# Patient Record
Sex: FEMALE | Race: WHITE | URBAN - METROPOLITAN AREA
[De-identification: names, ages, dates, MRNs, and addresses within clinical notes are randomized per-mention and may not be internally consistent; named-entity substitution may affect disease eponyms.]

---

## 2017-08-18 ENCOUNTER — HISTORICAL (OUTPATIENT)
Dept: RADIOLOGY | Facility: HOSPITAL | Age: 22
End: 2017-08-18

## 2018-01-10 ENCOUNTER — HISTORICAL (OUTPATIENT)
Dept: ADMINISTRATIVE | Facility: HOSPITAL | Age: 23
End: 2018-01-10

## 2018-01-10 LAB
B-HCG FREE SERPL-ACNC: 1899 MIU/ML
PROGEST SERPL-MCNC: 19.4 NG/ML

## 2020-01-29 ENCOUNTER — HISTORICAL (OUTPATIENT)
Dept: LAB | Facility: HOSPITAL | Age: 25
End: 2020-01-29

## 2020-01-29 LAB
ABS NEUT (OLG): 5.4 X10(3)/MCL (ref 1.5–6.9)
ALBUMIN SERPL-MCNC: 4 GM/DL (ref 3.4–5)
ALBUMIN/GLOB SERPL: 1 RATIO
ALP SERPL-CCNC: 61 UNIT/L (ref 30–113)
ALT SERPL-CCNC: 19 UNIT/L (ref 10–45)
AST SERPL-CCNC: 7 UNIT/L (ref 15–37)
BASOPHILS # BLD AUTO: 0 X10(3)/MCL (ref 0–0.1)
BASOPHILS NFR BLD AUTO: 0 % (ref 0–1)
BILIRUB SERPL-MCNC: 0.3 MG/DL (ref 0.1–0.9)
BILIRUBIN DIRECT+TOT PNL SERPL-MCNC: 0.1 MG/DL (ref 0–0.3)
BILIRUBIN DIRECT+TOT PNL SERPL-MCNC: 0.2 MG/DL
BUN SERPL-MCNC: 13 MG/DL (ref 10–20)
CALCIUM SERPL-MCNC: 8.9 MG/DL (ref 8–10.5)
CHLORIDE SERPL-SCNC: 106 MMOL/L (ref 100–108)
CO2 SERPL-SCNC: 28 MMOL/L (ref 21–35)
CREAT SERPL-MCNC: 0.76 MG/DL (ref 0.7–1.3)
ERYTHROCYTE [DISTWIDTH] IN BLOOD BY AUTOMATED COUNT: 11.6 % (ref 11.5–17)
GLOBULIN SER-MCNC: 3.9 GM/DL
GLUCOSE SERPL-MCNC: 87 MG/DL (ref 75–116)
HCT VFR BLD AUTO: 40.7 % (ref 36–48)
HGB BLD-MCNC: 13.4 GM/DL (ref 12–16)
IMM GRANULOCYTES # BLD AUTO: 0.02 10*3/UL (ref 0–0.02)
IMM GRANULOCYTES NFR BLD AUTO: 0.3 % (ref 0–0.43)
LYMPHOCYTES # BLD AUTO: 0.6 X10(3)/MCL (ref 0.5–4.1)
LYMPHOCYTES NFR BLD AUTO: 9 % (ref 15–40)
MCH RBC QN AUTO: 32 PG (ref 27–34)
MCHC RBC AUTO-ENTMCNC: 33 GM/DL (ref 31–36)
MCV RBC AUTO: 98 FL (ref 80–99)
MONOCYTES # BLD AUTO: 0.6 X10(3)/MCL (ref 0–1.1)
MONOCYTES NFR BLD AUTO: 9 % (ref 4–12)
NEUTROPHILS # BLD AUTO: 5.4 X10(3)/MCL (ref 1.5–6.9)
NEUTROPHILS NFR BLD AUTO: 82 % (ref 43–75)
PLATELET # BLD AUTO: 281 X10(3)/MCL (ref 140–400)
PMV BLD AUTO: 8.7 FL (ref 6.8–10)
POTASSIUM SERPL-SCNC: 3.7 MMOL/L (ref 3.6–5.2)
PROT SERPL-MCNC: 7.9 GM/DL (ref 6.4–8.2)
RBC # BLD AUTO: 4.17 X10(6)/MCL (ref 4.2–5.4)
SODIUM SERPL-SCNC: 141 MMOL/L (ref 135–145)
WBC # SPEC AUTO: 6.6 X10(3)/MCL (ref 4.5–11.5)

## 2021-01-28 ENCOUNTER — HISTORICAL (OUTPATIENT)
Dept: RADIOLOGY | Facility: HOSPITAL | Age: 26
End: 2021-01-28

## 2021-01-28 LAB
ABS NEUT (OLG): 2.5 X10(3)/MCL (ref 1.5–6.9)
ALBUMIN SERPL-MCNC: 4.6 GM/DL (ref 3.5–5)
ALBUMIN/GLOB SERPL: 1.4 RATIO (ref 1.1–2)
ALP SERPL-CCNC: 58 UNIT/L (ref 40–150)
ALT SERPL-CCNC: 17 UNIT/L (ref 0–55)
AMYLASE SERPL-CCNC: 68 UNIT/L (ref 25–125)
AST SERPL-CCNC: 15 UNIT/L (ref 5–34)
BILIRUB SERPL-MCNC: 0.5 MG/DL
BILIRUBIN DIRECT+TOT PNL SERPL-MCNC: 0.2 MG/DL (ref 0–0.5)
BILIRUBIN DIRECT+TOT PNL SERPL-MCNC: 0.3 MG/DL (ref 0–0.8)
BUN SERPL-MCNC: 8 MG/DL (ref 7–18.7)
CALCIUM SERPL-MCNC: 9.5 MG/DL (ref 8.4–10.2)
CHLORIDE SERPL-SCNC: 104 MMOL/L (ref 98–107)
CO2 SERPL-SCNC: 26 MMOL/L (ref 22–29)
CREAT SERPL-MCNC: 0.76 MG/DL (ref 0.55–1.02)
ERYTHROCYTE [DISTWIDTH] IN BLOOD BY AUTOMATED COUNT: 11.4 % (ref 11.5–17)
GLOBULIN SER-MCNC: 3.4 GM/DL (ref 2.4–3.5)
GLUCOSE SERPL-MCNC: 81 MG/DL (ref 74–100)
HCT VFR BLD AUTO: 42 % (ref 36–48)
HGB BLD-MCNC: 14.1 GM/DL (ref 12–16)
LIPASE SERPL-CCNC: 24 U/L
MCH RBC QN AUTO: 31 PG (ref 27–34)
MCHC RBC AUTO-ENTMCNC: 34 GM/DL (ref 31–36)
MCV RBC AUTO: 93 FL (ref 80–99)
PLATELET # BLD AUTO: 347 X10(3)/MCL (ref 140–400)
PMV BLD AUTO: 8.9 FL (ref 6.8–10)
POTASSIUM SERPL-SCNC: 4 MMOL/L (ref 3.5–5.1)
PROT SERPL-MCNC: 8 GM/DL (ref 6.4–8.3)
RBC # BLD AUTO: 4.51 X10(6)/MCL (ref 4.2–5.4)
SODIUM SERPL-SCNC: 140 MMOL/L (ref 136–145)
WBC # SPEC AUTO: 5.5 X10(3)/MCL (ref 4.5–11.5)

## 2023-11-28 LAB
C TRACH RRNA SPEC QL PROBE: NEGATIVE
HBV SURFACE AG SERPL QL IA: NEGATIVE
HIV 1+2 AB+HIV1 P24 AG SERPL QL IA: NEGATIVE
N GONORRHOEAE, AMPLIFIED DNA: NEGATIVE
RPR: NONREACTIVE
RUBELLA IMMUNE STATUS: NORMAL

## 2024-05-29 LAB — PRENATAL STREP B CULTURE: NEGATIVE

## 2024-06-07 ENCOUNTER — HOSPITAL ENCOUNTER (EMERGENCY)
Facility: HOSPITAL | Age: 29
Discharge: HOME | End: 2024-06-07
Payer: COMMERCIAL

## 2024-06-07 VITALS
SYSTOLIC BLOOD PRESSURE: 135 MMHG | DIASTOLIC BLOOD PRESSURE: 84 MMHG | HEART RATE: 107 BPM | BODY MASS INDEX: 36.8 KG/M2 | TEMPERATURE: 98 F | WEIGHT: 200 LBS | OXYGEN SATURATION: 97 % | RESPIRATION RATE: 22 BRPM | HEIGHT: 62 IN

## 2024-06-07 PROCEDURE — 99284 EMERGENCY DEPT VISIT MOD MDM: CPT

## 2024-06-08 NOTE — ED PROVIDER NOTES
"SHAKIRA NOTE     Admit Date: 2024  SHAKIRA Physician: Matt Deleon  Primary OBGYN: Dr. Palafox    Admit Diagnosis/Chief Complaint: Leakage of Fluid      Chief Complaint   Patient presents with    leaking fluid       Hospital Course:  Zayra Ceballos is a 28 y.o.  at 37w6d presents complaining of Leaking of Fluid,  Happened this morning.  No continuous leaking or big gush      Patient states no complications in this pregnancy.     Patient denies vaginal bleeding, contractions, headache, vision changes, RUQ pain, dysuria, fever, and nausea/vomiting.  Fetal Movement: normal.    BP (!) 134/90 (BP Location: Left arm, Patient Position: Lying)   Pulse (!) 118   Temp 98.2 °F (36.8 °C) (Oral)   Resp (!) 22   Ht 5' 2" (1.575 m)   Wt 90.7 kg (200 lb)   SpO2 97%   BMI 36.58 kg/m²   Temp:  [98.2 °F (36.8 °C)] 98.2 °F (36.8 °C)  Pulse:  [118] 118  Resp:  [22] 22  SpO2:  [97 %] 97 %  BP: (134)/(90) 134/90    General: in no apparent distress well developed and well nourished non-toxic in no respiratory distress and acyanotic  Abdominal: soft, nontender, nondistended, no abnormal masses, no epigastric pain FHT present  Back: lumbar tenderness absent   CVA tenderness none  Extremeties no redness or tenderness in the calves or thighs no edema    SSE:   SVE:SVE (PeriWATCH)  Dilation (cm): 0  Examined by:: CHET Ortega RN  Vaginal Exam Comments: Closed, thick, high       ROM PLUS negative    FHT:  Reactive  TOCO: Contractions irritability      LABS:   No results found for this or any previous visit (from the past 24 hour(s)).  [unfilled]     Imaging Results    None          ASSESMENT: Zayra Ceballos is a 28 y.o.   at 37w6d rule out rupture of membranes  Observation in SHAKIRA  Low suspicion of rupture of membranes   Labor precautions reviewed with patient  ER precautions reviewed with patient  Patient was given an opportunity to ask questions  Patient is to follow-up with her primary care " physician        Discharge Diagnosis/Clinical Impression**: Rule out Spontaneous Rupture Of Membranes    Status:Stable    Patient Instructions:       - Pt was given routine pregnancy instructions including to return to triage if she had any vaginal bleeding (other than spotting for the next 48hrs), any loss of fluid like her water broke, decreased fetal movement, or contractions Q 5min lasting for 2 or more hours. Pt was also instructed to drink copious water. Patient voiced understanding of all these instructions and was subsequently discharged home.    She will follow up with her primary OB.        This note was created with the assistance of [employer] voice recognition software. There may be transcription errors as a result of using this technology however minimal. Effort has been made to assure accuracy of transcription but any obvious errors or omissions should be clarified with the author of the document.

## 2024-06-08 NOTE — ED NOTES
EFM and toco removed for discharge. Discharge instructions given on when to return to hospital. Answered questions regarding labor precautions and taking accurate blood pressures at home. Verb understanding. Will follow up with primary OB for regularly scheduled prenatal visit next week. Ambulating independently off unit accompanied by mother to private vehicle.

## 2024-06-18 DIAGNOSIS — Z34.90 ENCOUNTER FOR INDUCTION OF LABOR: Primary | ICD-10-CM

## 2024-06-18 RX ORDER — OXYTOCIN/RINGER'S LACTATE 30/500 ML
95 PLASTIC BAG, INJECTION (ML) INTRAVENOUS ONCE AS NEEDED
Status: CANCELLED | OUTPATIENT
Start: 2024-06-18 | End: 2035-11-15

## 2024-06-18 RX ORDER — ACETAMINOPHEN 325 MG/1
650 TABLET ORAL EVERY 6 HOURS PRN
Status: CANCELLED | OUTPATIENT
Start: 2024-06-18

## 2024-06-18 RX ORDER — SODIUM CHLORIDE, SODIUM LACTATE, POTASSIUM CHLORIDE, CALCIUM CHLORIDE 600; 310; 30; 20 MG/100ML; MG/100ML; MG/100ML; MG/100ML
INJECTION, SOLUTION INTRAVENOUS CONTINUOUS
Status: CANCELLED | OUTPATIENT
Start: 2024-06-18

## 2024-06-18 RX ORDER — OXYTOCIN/RINGER'S LACTATE 30/500 ML
334 PLASTIC BAG, INJECTION (ML) INTRAVENOUS ONCE AS NEEDED
Status: CANCELLED | OUTPATIENT
Start: 2024-06-18 | End: 2035-11-15

## 2024-06-18 RX ORDER — LIDOCAINE HYDROCHLORIDE 10 MG/ML
10 INJECTION INFILTRATION; PERINEURAL ONCE AS NEEDED
Status: CANCELLED | OUTPATIENT
Start: 2024-06-18 | End: 2035-11-15

## 2024-06-18 RX ORDER — CARBOPROST TROMETHAMINE 250 UG/ML
250 INJECTION, SOLUTION INTRAMUSCULAR
Status: CANCELLED | OUTPATIENT
Start: 2024-06-18

## 2024-06-18 RX ORDER — CALCIUM CARBONATE 200(500)MG
500 TABLET,CHEWABLE ORAL 3 TIMES DAILY PRN
Status: CANCELLED | OUTPATIENT
Start: 2024-06-18

## 2024-06-18 RX ORDER — OXYTOCIN/RINGER'S LACTATE 30/500 ML
334 PLASTIC BAG, INJECTION (ML) INTRAVENOUS ONCE
Status: CANCELLED | OUTPATIENT
Start: 2024-06-18 | End: 2024-06-18

## 2024-06-18 RX ORDER — ONDANSETRON 4 MG/1
8 TABLET, ORALLY DISINTEGRATING ORAL EVERY 8 HOURS PRN
Status: CANCELLED | OUTPATIENT
Start: 2024-06-18

## 2024-06-18 RX ORDER — OXYTOCIN/RINGER'S LACTATE 30/500 ML
95 PLASTIC BAG, INJECTION (ML) INTRAVENOUS ONCE
Status: CANCELLED | OUTPATIENT
Start: 2024-06-18 | End: 2024-06-18

## 2024-06-18 RX ORDER — SIMETHICONE 80 MG
1 TABLET,CHEWABLE ORAL 4 TIMES DAILY PRN
Status: CANCELLED | OUTPATIENT
Start: 2024-06-18

## 2024-06-18 RX ORDER — DIPHENOXYLATE HYDROCHLORIDE AND ATROPINE SULFATE 2.5; .025 MG/1; MG/1
2 TABLET ORAL EVERY 6 HOURS PRN
Status: CANCELLED | OUTPATIENT
Start: 2024-06-18

## 2024-06-18 RX ORDER — MISOPROSTOL 100 UG/1
800 TABLET ORAL ONCE AS NEEDED
Status: CANCELLED | OUTPATIENT
Start: 2024-06-18 | End: 2035-11-15

## 2024-06-18 RX ORDER — BUTORPHANOL TARTRATE 2 MG/ML
1 INJECTION INTRAMUSCULAR; INTRAVENOUS
Status: CANCELLED | OUTPATIENT
Start: 2024-06-18

## 2024-06-18 RX ORDER — OXYTOCIN 10 [USP'U]/ML
10 INJECTION, SOLUTION INTRAMUSCULAR; INTRAVENOUS ONCE AS NEEDED
Status: CANCELLED | OUTPATIENT
Start: 2024-06-18 | End: 2035-11-15

## 2024-06-18 RX ORDER — BUTORPHANOL TARTRATE 2 MG/ML
2 INJECTION INTRAMUSCULAR; INTRAVENOUS
Status: CANCELLED | OUTPATIENT
Start: 2024-06-18

## 2024-06-18 RX ORDER — METHYLERGONOVINE MALEATE 0.2 MG/ML
200 INJECTION INTRAVENOUS ONCE AS NEEDED
Status: CANCELLED | OUTPATIENT
Start: 2024-06-18 | End: 2035-11-15

## 2024-06-18 RX ORDER — MISOPROSTOL 100 UG/1
800 TABLET ORAL ONCE AS NEEDED
Status: CANCELLED | OUTPATIENT
Start: 2024-06-18

## 2024-06-20 ENCOUNTER — HOSPITAL ENCOUNTER (INPATIENT)
Facility: HOSPITAL | Age: 29
LOS: 5 days | Discharge: HOME | End: 2024-06-25
Attending: OBSTETRICS & GYNECOLOGY | Admitting: OBSTETRICS & GYNECOLOGY
Payer: COMMERCIAL

## 2024-06-20 DIAGNOSIS — Z34.90 ENCOUNTER FOR INDUCTION OF LABOR: ICD-10-CM

## 2024-06-20 DIAGNOSIS — O61.9 FAILED INDUCTION OF LABOR: ICD-10-CM

## 2024-06-20 LAB
ABORH RETYPE: NORMAL
BASOPHILS # BLD AUTO: 0.02 X10(3)/MCL
BASOPHILS NFR BLD AUTO: 0.2 %
EOSINOPHIL # BLD AUTO: 0.05 X10(3)/MCL (ref 0–0.9)
EOSINOPHIL NFR BLD AUTO: 0.5 %
ERYTHROCYTE [DISTWIDTH] IN BLOOD BY AUTOMATED COUNT: 14.1 % (ref 11.5–17)
GROUP & RH: NORMAL
HCT VFR BLD AUTO: 27 % (ref 37–47)
HGB BLD-MCNC: 8.9 G/DL (ref 12–16)
IMM GRANULOCYTES # BLD AUTO: 0.06 X10(3)/MCL (ref 0–0.04)
IMM GRANULOCYTES NFR BLD AUTO: 0.6 %
INDIRECT COOMBS: NORMAL
LYMPHOCYTES # BLD AUTO: 1.82 X10(3)/MCL (ref 0.6–4.6)
LYMPHOCYTES NFR BLD AUTO: 19.3 %
MCH RBC QN AUTO: 28.6 PG (ref 27–31)
MCHC RBC AUTO-ENTMCNC: 33 G/DL (ref 33–36)
MCV RBC AUTO: 86.8 FL (ref 80–94)
MONOCYTES # BLD AUTO: 0.53 X10(3)/MCL (ref 0.1–1.3)
MONOCYTES NFR BLD AUTO: 5.6 %
NEUTROPHILS # BLD AUTO: 6.95 X10(3)/MCL (ref 2.1–9.2)
NEUTROPHILS NFR BLD AUTO: 73.8 %
NRBC BLD AUTO-RTO: 0 %
PLATELET # BLD AUTO: 299 X10(3)/MCL (ref 130–400)
PMV BLD AUTO: 9.4 FL (ref 7.4–10.4)
RBC # BLD AUTO: 3.11 X10(6)/MCL (ref 4.2–5.4)
SPECIMEN OUTDATE: NORMAL
T PALLIDUM AB SER QL: NONREACTIVE
WBC # BLD AUTO: 9.43 X10(3)/MCL (ref 4.5–11.5)

## 2024-06-20 PROCEDURE — 85025 COMPLETE CBC W/AUTO DIFF WBC: CPT | Performed by: OBSTETRICS & GYNECOLOGY

## 2024-06-20 PROCEDURE — 3E033VJ INTRODUCTION OF OTHER HORMONE INTO PERIPHERAL VEIN, PERCUTANEOUS APPROACH: ICD-10-PCS | Performed by: OBSTETRICS & GYNECOLOGY

## 2024-06-20 PROCEDURE — 86850 RBC ANTIBODY SCREEN: CPT | Performed by: OBSTETRICS & GYNECOLOGY

## 2024-06-20 PROCEDURE — 25000003 PHARM REV CODE 250: Performed by: OBSTETRICS & GYNECOLOGY

## 2024-06-20 PROCEDURE — 86900 BLOOD TYPING SEROLOGIC ABO: CPT | Performed by: OBSTETRICS & GYNECOLOGY

## 2024-06-20 PROCEDURE — 36415 COLL VENOUS BLD VENIPUNCTURE: CPT | Performed by: OBSTETRICS & GYNECOLOGY

## 2024-06-20 PROCEDURE — 72100003 HC LABOR CARE, EA. ADDL. 8 HRS

## 2024-06-20 PROCEDURE — 11000001 HC ACUTE MED/SURG PRIVATE ROOM

## 2024-06-20 PROCEDURE — 63600175 PHARM REV CODE 636 W HCPCS: Performed by: OBSTETRICS & GYNECOLOGY

## 2024-06-20 PROCEDURE — 86901 BLOOD TYPING SEROLOGIC RH(D): CPT | Performed by: OBSTETRICS & GYNECOLOGY

## 2024-06-20 PROCEDURE — 86780 TREPONEMA PALLIDUM: CPT | Performed by: OBSTETRICS & GYNECOLOGY

## 2024-06-20 PROCEDURE — 72100002 HC LABOR CARE, 1ST 8 HOURS

## 2024-06-20 RX ORDER — VALACYCLOVIR HYDROCHLORIDE 500 MG/1
1 TABLET, FILM COATED ORAL
COMMUNITY

## 2024-06-20 RX ORDER — CALCIUM CARBONATE 200(500)MG
500 TABLET,CHEWABLE ORAL 3 TIMES DAILY PRN
Status: DISCONTINUED | OUTPATIENT
Start: 2024-06-20 | End: 2024-06-24

## 2024-06-20 RX ORDER — OXYTOCIN 10 [USP'U]/ML
10 INJECTION, SOLUTION INTRAMUSCULAR; INTRAVENOUS ONCE AS NEEDED
Status: DISCONTINUED | OUTPATIENT
Start: 2024-06-20 | End: 2024-06-22

## 2024-06-20 RX ORDER — MISOPROSTOL 100 UG/1
800 TABLET ORAL ONCE AS NEEDED
Status: DISCONTINUED | OUTPATIENT
Start: 2024-06-20 | End: 2024-06-24

## 2024-06-20 RX ORDER — SERTRALINE HYDROCHLORIDE 50 MG/1
50 TABLET, FILM COATED ORAL DAILY
COMMUNITY

## 2024-06-20 RX ORDER — OXYTOCIN-SODIUM CHLORIDE 0.9% IV SOLN 30 UNIT/500ML 30-0.9/5 UT/ML-%
95 SOLUTION INTRAVENOUS ONCE AS NEEDED
Status: DISCONTINUED | OUTPATIENT
Start: 2024-06-20 | End: 2024-06-22

## 2024-06-20 RX ORDER — ACETAMINOPHEN 325 MG/1
650 TABLET ORAL EVERY 6 HOURS PRN
Status: DISCONTINUED | OUTPATIENT
Start: 2024-06-20 | End: 2024-06-24

## 2024-06-20 RX ORDER — BUTORPHANOL TARTRATE 2 MG/ML
2 INJECTION INTRAMUSCULAR; INTRAVENOUS
Status: DISCONTINUED | OUTPATIENT
Start: 2024-06-20 | End: 2024-06-22

## 2024-06-20 RX ORDER — SODIUM CHLORIDE, SODIUM LACTATE, POTASSIUM CHLORIDE, CALCIUM CHLORIDE 600; 310; 30; 20 MG/100ML; MG/100ML; MG/100ML; MG/100ML
INJECTION, SOLUTION INTRAVENOUS CONTINUOUS
Status: DISCONTINUED | OUTPATIENT
Start: 2024-06-20 | End: 2024-06-22

## 2024-06-20 RX ORDER — LIDOCAINE HYDROCHLORIDE 10 MG/ML
10 INJECTION INFILTRATION; PERINEURAL ONCE AS NEEDED
Status: DISCONTINUED | OUTPATIENT
Start: 2024-06-20 | End: 2024-06-22

## 2024-06-20 RX ORDER — OXYTOCIN-SODIUM CHLORIDE 0.9% IV SOLN 30 UNIT/500ML 30-0.9/5 UT/ML-%
334 SOLUTION INTRAVENOUS ONCE AS NEEDED
Status: DISCONTINUED | OUTPATIENT
Start: 2024-06-20 | End: 2024-06-22

## 2024-06-20 RX ORDER — BUTORPHANOL TARTRATE 2 MG/ML
1 INJECTION INTRAMUSCULAR; INTRAVENOUS
Status: DISCONTINUED | OUTPATIENT
Start: 2024-06-20 | End: 2024-06-22

## 2024-06-20 RX ORDER — ONDANSETRON 4 MG/1
8 TABLET, ORALLY DISINTEGRATING ORAL EVERY 8 HOURS PRN
Status: DISCONTINUED | OUTPATIENT
Start: 2024-06-20 | End: 2024-06-24

## 2024-06-20 RX ORDER — METHYLERGONOVINE MALEATE 0.2 MG/ML
200 INJECTION INTRAVENOUS ONCE AS NEEDED
Status: DISCONTINUED | OUTPATIENT
Start: 2024-06-20 | End: 2024-06-22

## 2024-06-20 RX ORDER — OXYTOCIN-SODIUM CHLORIDE 0.9% IV SOLN 30 UNIT/500ML 30-0.9/5 UT/ML-%
95 SOLUTION INTRAVENOUS ONCE
Status: DISCONTINUED | OUTPATIENT
Start: 2024-06-20 | End: 2024-06-22

## 2024-06-20 RX ORDER — SIMETHICONE 80 MG
1 TABLET,CHEWABLE ORAL 4 TIMES DAILY PRN
Status: DISCONTINUED | OUTPATIENT
Start: 2024-06-20 | End: 2024-06-24

## 2024-06-20 RX ORDER — OXYTOCIN-SODIUM CHLORIDE 0.9% IV SOLN 30 UNIT/500ML 30-0.9/5 UT/ML-%
334 SOLUTION INTRAVENOUS ONCE
Status: DISCONTINUED | OUTPATIENT
Start: 2024-06-20 | End: 2024-06-22

## 2024-06-20 RX ORDER — NAPROXEN SODIUM 220 MG/1
81 TABLET, FILM COATED ORAL DAILY
COMMUNITY

## 2024-06-20 RX ORDER — DIPHENOXYLATE HYDROCHLORIDE AND ATROPINE SULFATE 2.5; .025 MG/1; MG/1
2 TABLET ORAL EVERY 6 HOURS PRN
Status: DISCONTINUED | OUTPATIENT
Start: 2024-06-20 | End: 2024-06-24

## 2024-06-20 RX ORDER — CARBOPROST TROMETHAMINE 250 UG/ML
250 INJECTION, SOLUTION INTRAMUSCULAR
Status: DISCONTINUED | OUTPATIENT
Start: 2024-06-20 | End: 2024-06-22

## 2024-06-20 RX ADMIN — SODIUM CHLORIDE, POTASSIUM CHLORIDE, SODIUM LACTATE AND CALCIUM CHLORIDE: 600; 310; 30; 20 INJECTION, SOLUTION INTRAVENOUS at 08:06

## 2024-06-20 RX ADMIN — DINOPROSTONE 10 MG: 10 INSERT VAGINAL at 08:06

## 2024-06-21 ENCOUNTER — ANESTHESIA EVENT (OUTPATIENT)
Dept: OBSTETRICS AND GYNECOLOGY | Facility: HOSPITAL | Age: 29
End: 2024-06-21
Payer: COMMERCIAL

## 2024-06-21 ENCOUNTER — ANESTHESIA (OUTPATIENT)
Dept: OBSTETRICS AND GYNECOLOGY | Facility: HOSPITAL | Age: 29
End: 2024-06-21
Payer: COMMERCIAL

## 2024-06-21 PROBLEM — O61.9 FAILED INDUCTION: Status: ACTIVE | Noted: 2024-06-21

## 2024-06-21 PROCEDURE — 25000003 PHARM REV CODE 250

## 2024-06-21 PROCEDURE — 51702 INSERT TEMP BLADDER CATH: CPT

## 2024-06-21 PROCEDURE — 27000181 HC CABLE, IUPC

## 2024-06-21 PROCEDURE — 27000492 HC SLEEVE, SCD T/L

## 2024-06-21 PROCEDURE — 62326 NJX INTERLAMINAR LMBR/SAC: CPT | Performed by: ANESTHESIOLOGY

## 2024-06-21 PROCEDURE — 63600175 PHARM REV CODE 636 W HCPCS: Mod: JZ,JG | Performed by: ANESTHESIOLOGY

## 2024-06-21 PROCEDURE — 63600175 PHARM REV CODE 636 W HCPCS: Performed by: OBSTETRICS & GYNECOLOGY

## 2024-06-21 PROCEDURE — 63600175 PHARM REV CODE 636 W HCPCS: Performed by: ANESTHESIOLOGY

## 2024-06-21 PROCEDURE — 59514 CESAREAN DELIVERY ONLY: CPT | Mod: QY,ANES,, | Performed by: ANESTHESIOLOGY

## 2024-06-21 PROCEDURE — 27201423 OPTIME MED/SURG SUP & DEVICES STERILE SUPPLY: Performed by: OBSTETRICS & GYNECOLOGY

## 2024-06-21 PROCEDURE — 25000003 PHARM REV CODE 250: Performed by: OBSTETRICS & GYNECOLOGY

## 2024-06-21 PROCEDURE — 11000001 HC ACUTE MED/SURG PRIVATE ROOM

## 2024-06-21 PROCEDURE — 86923 COMPATIBILITY TEST ELECTRIC: CPT | Mod: 91 | Performed by: OBSTETRICS & GYNECOLOGY

## 2024-06-21 PROCEDURE — 01968 ANES/ANALG CS DLVR NEURAXIAL: CPT | Mod: QY,ANES,, | Performed by: ANESTHESIOLOGY

## 2024-06-21 PROCEDURE — 59514 CESAREAN DELIVERY ONLY: CPT | Mod: QX,CRNA,,

## 2024-06-21 PROCEDURE — 37000008 HC ANESTHESIA 1ST 15 MINUTES: Performed by: OBSTETRICS & GYNECOLOGY

## 2024-06-21 PROCEDURE — 10907ZC DRAINAGE OF AMNIOTIC FLUID, THERAPEUTIC FROM PRODUCTS OF CONCEPTION, VIA NATURAL OR ARTIFICIAL OPENING: ICD-10-PCS | Performed by: OBSTETRICS & GYNECOLOGY

## 2024-06-21 PROCEDURE — 36004725 HC OB OR TIME LEV III - EA ADD 15 MIN: Performed by: OBSTETRICS & GYNECOLOGY

## 2024-06-21 PROCEDURE — 27200918 HC ALEXIS O RING

## 2024-06-21 PROCEDURE — 36004724 HC OB OR TIME LEV III - 1ST 15 MIN: Performed by: OBSTETRICS & GYNECOLOGY

## 2024-06-21 PROCEDURE — 71000033 HC RECOVERY, INTIAL HOUR: Performed by: OBSTETRICS & GYNECOLOGY

## 2024-06-21 PROCEDURE — 01968 ANES/ANALG CS DLVR NEURAXIAL: CPT | Mod: QX,CRNA,,

## 2024-06-21 PROCEDURE — 25000003 PHARM REV CODE 250: Performed by: ANESTHESIOLOGY

## 2024-06-21 PROCEDURE — 37000009 HC ANESTHESIA EA ADD 15 MINS: Performed by: OBSTETRICS & GYNECOLOGY

## 2024-06-21 PROCEDURE — 63600175 PHARM REV CODE 636 W HCPCS

## 2024-06-21 RX ORDER — SODIUM CITRATE AND CITRIC ACID MONOHYDRATE 334; 500 MG/5ML; MG/5ML
30 SOLUTION ORAL ONCE
Status: COMPLETED | OUTPATIENT
Start: 2024-06-21 | End: 2024-06-21

## 2024-06-21 RX ORDER — AMOXICILLIN 250 MG
1 CAPSULE ORAL NIGHTLY PRN
Status: DISCONTINUED | OUTPATIENT
Start: 2024-06-21 | End: 2024-06-25 | Stop reason: HOSPADM

## 2024-06-21 RX ORDER — SODIUM CHLORIDE, SODIUM LACTATE, POTASSIUM CHLORIDE, CALCIUM CHLORIDE 600; 310; 30; 20 MG/100ML; MG/100ML; MG/100ML; MG/100ML
INJECTION, SOLUTION INTRAVENOUS CONTINUOUS PRN
Status: DISCONTINUED | OUTPATIENT
Start: 2024-06-21 | End: 2024-06-21

## 2024-06-21 RX ORDER — OXYTOCIN-SODIUM CHLORIDE 0.9% IV SOLN 30 UNIT/500ML 30-0.9/5 UT/ML-%
10 SOLUTION INTRAVENOUS ONCE AS NEEDED
Status: DISCONTINUED | OUTPATIENT
Start: 2024-06-21 | End: 2024-06-25 | Stop reason: HOSPADM

## 2024-06-21 RX ORDER — SODIUM CITRATE AND CITRIC ACID MONOHYDRATE 334; 500 MG/5ML; MG/5ML
30 SOLUTION ORAL
Status: DISCONTINUED | OUTPATIENT
Start: 2024-06-21 | End: 2024-06-22

## 2024-06-21 RX ORDER — OXYTOCIN/RINGER'S LACTATE 30/500 ML
PLASTIC BAG, INJECTION (ML) INTRAVENOUS
Status: DISCONTINUED | OUTPATIENT
Start: 2024-06-21 | End: 2024-06-21

## 2024-06-21 RX ORDER — OXYTOCIN-SODIUM CHLORIDE 0.9% IV SOLN 30 UNIT/500ML 30-0.9/5 UT/ML-%
0-32 SOLUTION INTRAVENOUS CONTINUOUS
Status: DISCONTINUED | OUTPATIENT
Start: 2024-06-21 | End: 2024-06-21

## 2024-06-21 RX ORDER — ONDANSETRON HYDROCHLORIDE 2 MG/ML
4 INJECTION, SOLUTION INTRAVENOUS EVERY 12 HOURS PRN
Status: DISCONTINUED | OUTPATIENT
Start: 2024-06-21 | End: 2024-06-24

## 2024-06-21 RX ORDER — CARBOPROST TROMETHAMINE 250 UG/ML
250 INJECTION, SOLUTION INTRAMUSCULAR
Status: DISCONTINUED | OUTPATIENT
Start: 2024-06-21 | End: 2024-06-25 | Stop reason: HOSPADM

## 2024-06-21 RX ORDER — DEXAMETHASONE SODIUM PHOSPHATE 4 MG/ML
INJECTION, SOLUTION INTRA-ARTICULAR; INTRALESIONAL; INTRAMUSCULAR; INTRAVENOUS; SOFT TISSUE
Status: DISCONTINUED | OUTPATIENT
Start: 2024-06-21 | End: 2024-06-21

## 2024-06-21 RX ORDER — ACETAMINOPHEN 10 MG/ML
INJECTION, SOLUTION INTRAVENOUS
Status: DISCONTINUED | OUTPATIENT
Start: 2024-06-21 | End: 2024-06-21

## 2024-06-21 RX ORDER — KETOROLAC TROMETHAMINE 30 MG/ML
30 INJECTION, SOLUTION INTRAMUSCULAR; INTRAVENOUS EVERY 8 HOURS
Status: COMPLETED | OUTPATIENT
Start: 2024-06-22 | End: 2024-06-22

## 2024-06-21 RX ORDER — KETOROLAC TROMETHAMINE 30 MG/ML
INJECTION, SOLUTION INTRAMUSCULAR; INTRAVENOUS
Status: DISCONTINUED | OUTPATIENT
Start: 2024-06-21 | End: 2024-06-21

## 2024-06-21 RX ORDER — ONDANSETRON HYDROCHLORIDE 2 MG/ML
4 INJECTION, SOLUTION INTRAVENOUS ONCE AS NEEDED
Status: DISCONTINUED | OUTPATIENT
Start: 2024-06-21 | End: 2024-06-21

## 2024-06-21 RX ORDER — FENTANYL/BUPIVACAINE/NS/PF 2-1250MCG
PLASTIC BAG, INJECTION (ML) INJECTION CONTINUOUS PRN
Status: DISCONTINUED | OUTPATIENT
Start: 2024-06-21 | End: 2024-06-21

## 2024-06-21 RX ORDER — MISOPROSTOL 100 UG/1
800 TABLET ORAL ONCE AS NEEDED
Status: DISCONTINUED | OUTPATIENT
Start: 2024-06-21 | End: 2024-06-25 | Stop reason: HOSPADM

## 2024-06-21 RX ORDER — BISACODYL 10 MG/1
10 SUPPOSITORY RECTAL ONCE AS NEEDED
Status: DISCONTINUED | OUTPATIENT
Start: 2024-06-21 | End: 2024-06-25 | Stop reason: HOSPADM

## 2024-06-21 RX ORDER — CEFAZOLIN SODIUM 1 G/3ML
INJECTION, POWDER, FOR SOLUTION INTRAMUSCULAR; INTRAVENOUS
Status: DISCONTINUED | OUTPATIENT
Start: 2024-06-21 | End: 2024-06-21

## 2024-06-21 RX ORDER — NALOXONE HCL 0.4 MG/ML
0.02 VIAL (ML) INJECTION
Status: DISCONTINUED | OUTPATIENT
Start: 2024-06-21 | End: 2024-06-21

## 2024-06-21 RX ORDER — DOCUSATE SODIUM 100 MG/1
200 CAPSULE, LIQUID FILLED ORAL 2 TIMES DAILY
Status: DISCONTINUED | OUTPATIENT
Start: 2024-06-21 | End: 2024-06-25 | Stop reason: HOSPADM

## 2024-06-21 RX ORDER — MISOPROSTOL 100 UG/1
800 TABLET ORAL
Status: DISCONTINUED | OUTPATIENT
Start: 2024-06-21 | End: 2024-06-22

## 2024-06-21 RX ORDER — PROPOFOL 10 MG/ML
VIAL (ML) INTRAVENOUS
Status: DISCONTINUED | OUTPATIENT
Start: 2024-06-21 | End: 2024-06-21

## 2024-06-21 RX ORDER — ONDANSETRON HYDROCHLORIDE 2 MG/ML
4 INJECTION, SOLUTION INTRAVENOUS EVERY 6 HOURS PRN
Status: DISCONTINUED | OUTPATIENT
Start: 2024-06-21 | End: 2024-06-21

## 2024-06-21 RX ORDER — EPHEDRINE SULFATE 50 MG/ML
10 INJECTION, SOLUTION INTRAVENOUS ONCE AS NEEDED
Status: COMPLETED | OUTPATIENT
Start: 2024-06-21 | End: 2024-06-21

## 2024-06-21 RX ORDER — OXYTOCIN 10 [USP'U]/ML
10 INJECTION, SOLUTION INTRAMUSCULAR; INTRAVENOUS ONCE AS NEEDED
Status: DISCONTINUED | OUTPATIENT
Start: 2024-06-21 | End: 2024-06-25 | Stop reason: HOSPADM

## 2024-06-21 RX ORDER — DIPHENHYDRAMINE HYDROCHLORIDE 50 MG/ML
12.5 INJECTION INTRAMUSCULAR; INTRAVENOUS EVERY 4 HOURS PRN
Status: DISCONTINUED | OUTPATIENT
Start: 2024-06-21 | End: 2024-06-24

## 2024-06-21 RX ORDER — METHYLERGONOVINE MALEATE 0.2 MG/ML
200 INJECTION INTRAVENOUS ONCE AS NEEDED
Status: DISCONTINUED | OUTPATIENT
Start: 2024-06-21 | End: 2024-06-25 | Stop reason: HOSPADM

## 2024-06-21 RX ORDER — PHENYLEPHRINE HCL IN 0.9% NACL 1 MG/10 ML
SYRINGE (ML) INTRAVENOUS
Status: DISCONTINUED | OUTPATIENT
Start: 2024-06-21 | End: 2024-06-21

## 2024-06-21 RX ORDER — BUPIVACAINE HYDROCHLORIDE 2.5 MG/ML
INJECTION, SOLUTION EPIDURAL; INFILTRATION; INTRACAUDAL
Status: COMPLETED | OUTPATIENT
Start: 2024-06-21 | End: 2024-06-21

## 2024-06-21 RX ORDER — PROCHLORPERAZINE EDISYLATE 5 MG/ML
5 INJECTION INTRAMUSCULAR; INTRAVENOUS EVERY 6 HOURS PRN
Status: DISCONTINUED | OUTPATIENT
Start: 2024-06-21 | End: 2024-06-24

## 2024-06-21 RX ORDER — PRENATAL WITH FERROUS FUM AND FOLIC ACID 3080; 920; 120; 400; 22; 1.84; 3; 20; 10; 1; 12; 200; 27; 25; 2 [IU]/1; [IU]/1; MG/1; [IU]/1; MG/1; MG/1; MG/1; MG/1; MG/1; MG/1; UG/1; MG/1; MG/1; MG/1; MG/1
1 TABLET ORAL DAILY
Status: DISCONTINUED | OUTPATIENT
Start: 2024-06-22 | End: 2024-06-25 | Stop reason: HOSPADM

## 2024-06-21 RX ORDER — SIMETHICONE 80 MG
1 TABLET,CHEWABLE ORAL EVERY 6 HOURS PRN
Status: DISCONTINUED | OUTPATIENT
Start: 2024-06-21 | End: 2024-06-25 | Stop reason: HOSPADM

## 2024-06-21 RX ORDER — FENTANYL/BUPIVACAINE/NS/PF 2-1250MCG
PLASTIC BAG, INJECTION (ML) INJECTION CONTINUOUS
Status: DISCONTINUED | OUTPATIENT
Start: 2024-06-21 | End: 2024-06-22

## 2024-06-21 RX ORDER — OXYTOCIN-SODIUM CHLORIDE 0.9% IV SOLN 30 UNIT/500ML 30-0.9/5 UT/ML-%
95 SOLUTION INTRAVENOUS ONCE
Status: DISCONTINUED | OUTPATIENT
Start: 2024-06-21 | End: 2024-06-25 | Stop reason: HOSPADM

## 2024-06-21 RX ORDER — ONDANSETRON HYDROCHLORIDE 2 MG/ML
INJECTION, SOLUTION INTRAVENOUS
Status: DISCONTINUED | OUTPATIENT
Start: 2024-06-21 | End: 2024-06-21

## 2024-06-21 RX ORDER — HYDROMORPHONE HYDROCHLORIDE 2 MG/ML
1 INJECTION, SOLUTION INTRAMUSCULAR; INTRAVENOUS; SUBCUTANEOUS EVERY 4 HOURS PRN
Status: DISCONTINUED | OUTPATIENT
Start: 2024-06-21 | End: 2024-06-25 | Stop reason: HOSPADM

## 2024-06-21 RX ORDER — SODIUM CHLORIDE, SODIUM LACTATE, POTASSIUM CHLORIDE, CALCIUM CHLORIDE 600; 310; 30; 20 MG/100ML; MG/100ML; MG/100ML; MG/100ML
INJECTION, SOLUTION INTRAVENOUS CONTINUOUS
Status: DISCONTINUED | OUTPATIENT
Start: 2024-06-21 | End: 2024-06-22

## 2024-06-21 RX ORDER — DIPHENOXYLATE HYDROCHLORIDE AND ATROPINE SULFATE 2.5; .025 MG/1; MG/1
2 TABLET ORAL EVERY 6 HOURS PRN
Status: DISCONTINUED | OUTPATIENT
Start: 2024-06-21 | End: 2024-06-25 | Stop reason: HOSPADM

## 2024-06-21 RX ORDER — FAMOTIDINE 10 MG/ML
20 INJECTION INTRAVENOUS
Status: DISCONTINUED | OUTPATIENT
Start: 2024-06-21 | End: 2024-06-22

## 2024-06-21 RX ORDER — NALOXONE HCL 0.4 MG/ML
0.04 VIAL (ML) INJECTION EVERY 10 MIN PRN
Status: DISCONTINUED | OUTPATIENT
Start: 2024-06-21 | End: 2024-06-24

## 2024-06-21 RX ORDER — OXYTOCIN-SODIUM CHLORIDE 0.9% IV SOLN 30 UNIT/500ML 30-0.9/5 UT/ML-%
95 SOLUTION INTRAVENOUS ONCE
Status: DISCONTINUED | OUTPATIENT
Start: 2024-06-21 | End: 2024-06-24

## 2024-06-21 RX ORDER — METHYLERGONOVINE MALEATE 0.2 MG/ML
200 INJECTION INTRAVENOUS
Status: DISCONTINUED | OUTPATIENT
Start: 2024-06-21 | End: 2024-06-22

## 2024-06-21 RX ORDER — LIDOCAINE HYDROCHLORIDE 20 MG/ML
INJECTION, SOLUTION EPIDURAL; INFILTRATION; INTRACAUDAL; PERINEURAL
Status: DISCONTINUED | OUTPATIENT
Start: 2024-06-21 | End: 2024-06-21

## 2024-06-21 RX ORDER — OXYCODONE AND ACETAMINOPHEN 10; 325 MG/1; MG/1
1 TABLET ORAL EVERY 4 HOURS PRN
Status: DISCONTINUED | OUTPATIENT
Start: 2024-06-21 | End: 2024-06-25 | Stop reason: HOSPADM

## 2024-06-21 RX ORDER — OXYCODONE AND ACETAMINOPHEN 5; 325 MG/1; MG/1
1 TABLET ORAL EVERY 4 HOURS PRN
Status: DISCONTINUED | OUTPATIENT
Start: 2024-06-21 | End: 2024-06-25 | Stop reason: HOSPADM

## 2024-06-21 RX ORDER — MORPHINE SULFATE 1 MG/ML
INJECTION, SOLUTION EPIDURAL; INTRATHECAL; INTRAVENOUS
Status: DISCONTINUED | OUTPATIENT
Start: 2024-06-21 | End: 2024-06-21

## 2024-06-21 RX ORDER — CARBOPROST TROMETHAMINE 250 UG/ML
250 INJECTION, SOLUTION INTRAMUSCULAR
Status: DISCONTINUED | OUTPATIENT
Start: 2024-06-21 | End: 2024-06-22

## 2024-06-21 RX ORDER — ONDANSETRON 4 MG/1
8 TABLET, ORALLY DISINTEGRATING ORAL EVERY 8 HOURS PRN
Status: DISCONTINUED | OUTPATIENT
Start: 2024-06-21 | End: 2024-06-25 | Stop reason: HOSPADM

## 2024-06-21 RX ORDER — OXYTOCIN-SODIUM CHLORIDE 0.9% IV SOLN 30 UNIT/500ML 30-0.9/5 UT/ML-%
10 SOLUTION INTRAVENOUS ONCE
Status: DISCONTINUED | OUTPATIENT
Start: 2024-06-21 | End: 2024-06-22

## 2024-06-21 RX ORDER — IBUPROFEN 800 MG/1
800 TABLET ORAL EVERY 8 HOURS
Status: DISCONTINUED | OUTPATIENT
Start: 2024-06-23 | End: 2024-06-25 | Stop reason: HOSPADM

## 2024-06-21 RX ORDER — MISOPROSTOL 100 MCG
25 TABLET ORAL ONCE
Status: COMPLETED | OUTPATIENT
Start: 2024-06-21 | End: 2024-06-21

## 2024-06-21 RX ORDER — EPHEDRINE SULFATE 50 MG/ML
10 INJECTION, SOLUTION INTRAVENOUS ONCE
Status: COMPLETED | OUTPATIENT
Start: 2024-06-21 | End: 2024-06-21

## 2024-06-21 RX ORDER — DIPHENHYDRAMINE HCL 25 MG
25 CAPSULE ORAL EVERY 4 HOURS PRN
Status: DISCONTINUED | OUTPATIENT
Start: 2024-06-21 | End: 2024-06-25 | Stop reason: HOSPADM

## 2024-06-21 RX ORDER — OXYTOCIN-SODIUM CHLORIDE 0.9% IV SOLN 30 UNIT/500ML 30-0.9/5 UT/ML-%
95 SOLUTION INTRAVENOUS ONCE AS NEEDED
Status: DISCONTINUED | OUTPATIENT
Start: 2024-06-21 | End: 2024-06-25 | Stop reason: HOSPADM

## 2024-06-21 RX ORDER — ADHESIVE BANDAGE
30 BANDAGE TOPICAL 2 TIMES DAILY PRN
Status: DISCONTINUED | OUTPATIENT
Start: 2024-06-22 | End: 2024-06-25 | Stop reason: HOSPADM

## 2024-06-21 RX ORDER — LIDOCAINE HYDROCHLORIDE AND EPINEPHRINE 15; 5 MG/ML; UG/ML
INJECTION, SOLUTION EPIDURAL
Status: DISCONTINUED | OUTPATIENT
Start: 2024-06-21 | End: 2024-06-21

## 2024-06-21 RX ORDER — SERTRALINE HYDROCHLORIDE 50 MG/1
50 TABLET, FILM COATED ORAL DAILY
Status: DISCONTINUED | OUTPATIENT
Start: 2024-06-22 | End: 2024-06-25 | Stop reason: HOSPADM

## 2024-06-21 RX ORDER — CEFAZOLIN SODIUM 2 G/50ML
2 SOLUTION INTRAVENOUS ONCE AS NEEDED
Status: DISCONTINUED | OUTPATIENT
Start: 2024-06-21 | End: 2024-06-22

## 2024-06-21 RX ORDER — NALOXONE HCL 0.4 MG/ML
0.04 VIAL (ML) INJECTION
Status: DISCONTINUED | OUTPATIENT
Start: 2024-06-21 | End: 2024-06-24

## 2024-06-21 RX ORDER — FENTANYL CITRATE 50 UG/ML
INJECTION, SOLUTION INTRAMUSCULAR; INTRAVENOUS
Status: DISCONTINUED | OUTPATIENT
Start: 2024-06-21 | End: 2024-06-21

## 2024-06-21 RX ADMIN — ACETAMINOPHEN 325MG 650 MG: 325 TABLET ORAL at 06:06

## 2024-06-21 RX ADMIN — SODIUM CITRATE AND CITRIC ACID MONOHYDRATE 30 ML: 500; 334 SOLUTION ORAL at 02:06

## 2024-06-21 RX ADMIN — KETOROLAC TROMETHAMINE 30 MG: 30 INJECTION, SOLUTION INTRAMUSCULAR; INTRAVENOUS at 10:06

## 2024-06-21 RX ADMIN — LIDOCAINE HYDROCHLORIDE 5 ML: 20 INJECTION, SOLUTION EPIDURAL; INFILTRATION; INTRACAUDAL; PERINEURAL at 10:06

## 2024-06-21 RX ADMIN — ACETAMINOPHEN 1000 MG: 10 INJECTION, SOLUTION INTRAVENOUS at 10:06

## 2024-06-21 RX ADMIN — Medication 200 MCG: at 10:06

## 2024-06-21 RX ADMIN — MORPHINE SULFATE 4 MG: 1 INJECTION, SOLUTION EPIDURAL; INTRATHECAL; INTRAVENOUS at 10:06

## 2024-06-21 RX ADMIN — SODIUM CHLORIDE, POTASSIUM CHLORIDE, SODIUM LACTATE AND CALCIUM CHLORIDE 1000 ML: 600; 310; 30; 20 INJECTION, SOLUTION INTRAVENOUS at 01:06

## 2024-06-21 RX ADMIN — SODIUM CHLORIDE, POTASSIUM CHLORIDE, SODIUM LACTATE AND CALCIUM CHLORIDE: 600; 310; 30; 20 INJECTION, SOLUTION INTRAVENOUS at 03:06

## 2024-06-21 RX ADMIN — DEXAMETHASONE SODIUM PHOSPHATE 8 MG: 4 INJECTION, SOLUTION INTRA-ARTICULAR; INTRALESIONAL; INTRAMUSCULAR; INTRAVENOUS; SOFT TISSUE at 10:06

## 2024-06-21 RX ADMIN — CEFAZOLIN 2 G: 330 INJECTION, POWDER, FOR SOLUTION INTRAMUSCULAR; INTRAVENOUS at 10:06

## 2024-06-21 RX ADMIN — PROPOFOL 20 MG: 10 INJECTION, EMULSION INTRAVENOUS at 10:06

## 2024-06-21 RX ADMIN — MISOPROSTOL 25 MCG: 100 TABLET ORAL at 09:06

## 2024-06-21 RX ADMIN — SODIUM CHLORIDE, POTASSIUM CHLORIDE, SODIUM LACTATE AND CALCIUM CHLORIDE 500 ML: 600; 310; 30; 20 INJECTION, SOLUTION INTRAVENOUS at 02:06

## 2024-06-21 RX ADMIN — SODIUM CHLORIDE, POTASSIUM CHLORIDE, SODIUM LACTATE AND CALCIUM CHLORIDE 1000 ML: 600; 310; 30; 20 INJECTION, SOLUTION INTRAVENOUS at 09:06

## 2024-06-21 RX ADMIN — ONDANSETRON 8 MG: 2 INJECTION INTRAMUSCULAR; INTRAVENOUS at 09:06

## 2024-06-21 RX ADMIN — SODIUM CHLORIDE, POTASSIUM CHLORIDE, SODIUM LACTATE AND CALCIUM CHLORIDE: 600; 310; 30; 20 INJECTION, SOLUTION INTRAVENOUS at 09:06

## 2024-06-21 RX ADMIN — EPHEDRINE SULFATE 10 MG: 50 INJECTION INTRAVENOUS at 02:06

## 2024-06-21 RX ADMIN — LIDOCAINE HYDROCHLORIDE 10 ML: 20 INJECTION, SOLUTION EPIDURAL; INFILTRATION; INTRACAUDAL; PERINEURAL at 09:06

## 2024-06-21 RX ADMIN — MORPHINE SULFATE 3 MG: 1 INJECTION, SOLUTION EPIDURAL; INTRATHECAL; INTRAVENOUS at 10:06

## 2024-06-21 RX ADMIN — BUPIVACAINE HYDROCHLORIDE 10 ML: 2.5 INJECTION, SOLUTION EPIDURAL; INFILTRATION; INTRACAUDAL; PERINEURAL at 02:06

## 2024-06-21 RX ADMIN — AZITHROMYCIN 500 MG: 500 INJECTION, POWDER, LYOPHILIZED, FOR SOLUTION INTRAVENOUS at 10:06

## 2024-06-21 RX ADMIN — Medication 500 ML: at 10:06

## 2024-06-21 RX ADMIN — Medication 12 ML/HR: at 02:06

## 2024-06-21 RX ADMIN — EPHEDRINE SULFATE 10 MG: 50 INJECTION INTRAVENOUS at 03:06

## 2024-06-21 RX ADMIN — Medication 2 MILLI-UNITS/MIN: at 01:06

## 2024-06-21 RX ADMIN — FENTANYL CITRATE 100 MCG: 50 INJECTION, SOLUTION INTRAMUSCULAR; INTRAVENOUS at 09:06

## 2024-06-21 RX ADMIN — SODIUM CHLORIDE, POTASSIUM CHLORIDE, SODIUM LACTATE AND CALCIUM CHLORIDE: 600; 310; 30; 20 INJECTION, SOLUTION INTRAVENOUS at 11:06

## 2024-06-21 RX ADMIN — LIDOCAINE HYDROCHLORIDE,EPINEPHRINE BITARTRATE 3 ML: 15; .005 INJECTION, SOLUTION EPIDURAL; INFILTRATION; INTRACAUDAL; PERINEURAL at 02:06

## 2024-06-21 RX ADMIN — Medication 12 ML/HR: at 07:06

## 2024-06-21 NOTE — ANESTHESIA PREPROCEDURE EVALUATION
2024  Zayra Ceballos is a 28 y.o., female  at term, induction +obesity for ORALIA      Pre-op Assessment    I have reviewed the NPO Status.      Review of Systems  Psych:  Psychiatric History                  Physical Exam  General: Well nourished, Cooperative, Alert and Oriented    Airway:  Mallampati: III   Mouth Opening: Normal  TM Distance: Normal  Tongue: Normal  Neck ROM: Normal ROM    Dental:  Intact    Chest/Lungs:  Clear to auscultation, Normal Respiratory Rate    Heart:  Rate: Normal  Rhythm: Regular Rhythm    Abdomen:  gravid     Latest Reference Range & Units 24 20:08 24 20:10   WBC 4.50 - 11.50 x10(3)/mcL 9.43    Hemoglobin 12.0 - 16.0 g/dL 8.9 (L)    Hematocrit 37.0 - 47.0 % 27.0 (L)    Platelet Count 130 - 400 x10(3)/mcL 299    Group & Rh   B POS   Indirect Frank GEL   NEG   Specimen Outdate   2024 23:59   (L): Data is abnormally low    Anesthesia Plan  Type of Anesthesia, risks & benefits discussed:    Anesthesia Type: Epidural  Intra-op Monitoring Plan: Standard ASA Monitors  Post Op Pain Control Plan: epidural analgesia  Induction:  IV  Informed Consent: Informed consent signed with the Patient and all parties understand the risks and agree with anesthesia plan.  All questions answered. Patient consented to blood products? Yes  ASA Score: 2  Day of Surgery Review of History & Physical: H&P Update referred to the surgeon/provider.  Anesthesia Plan Notes: Premedication: Oral Bicitra and Lactated Ringers Preload  Technique: Epidural for labor analgesia - plan to use if functioning well if C/S is required for delivery  Please refer to nursing records for toco fetal heart tone information    Ready For Surgery From Anesthesia Perspective.     .

## 2024-06-21 NOTE — ANESTHESIA PROCEDURE NOTES
Epidural    Patient location during procedure: OB   Reason for block: primary anesthetic   Reason for block: labor analgesia requested by patient and obstetrician  Diagnosis: IUP in active labor   Start time: 6/21/2024 2:12 PM  Timeout: 6/21/2024 2:10 PM  End time: 6/21/2024 2:31 PM    Staffing  Performing Provider: Azul Watters MD  Authorizing Provider: Azul Watters MD    Staffing  Performed by: Azul Watters MD  Authorized by: Azul Watters MD        Preanesthetic Checklist  Completed: patient identified, IV checked, site marked, risks and benefits discussed, surgical consent, monitors and equipment checked, pre-op evaluation, timeout performed, anesthesia consent given, hand hygiene performed and patient being monitored  Preparation  Patient position: sitting  Prep: ChloraPrep  Patient monitoring: Blood Pressure  Reason for block: primary anesthetic   Epidural  Skin Anesthetic: lidocaine 1%  Skin Wheal: 5 mL  Administration type: continuous  Approach: midline  Interspace: L3-4    Injection technique: MAMI air  Needle and Epidural Catheter  Needle type: Tuohy   Needle gauge: 17  Needle length: 5.0 inches  Catheter type: multi-orifice  Catheter size: 18 G  Insertion Attempts: 1  Test dose: 4 mL  Additional Documentation: incremental injection, no paresthesia on injection, negative aspiration for heme and CSF and no signs/symptoms of IV or SA injection  Needle localization: anatomical landmarks  Assessment  Ease of block: moderate (Ligament dense and deep- pt very cooperative)  Patient's tolerance of the procedure: comfortable throughout block and no complaints No inadvertent dural puncture with Tuohy.  Dural puncture not performed with spinal needle    Medications:    Medications: bupivacaine (pf) (MARCAINE) injection 0.25% - Epidural   10 mL - 6/21/2024 2:25:00 PM

## 2024-06-21 NOTE — H&P
HISTORY AND PHYSICAL                                                OBSTETRICS          Subjective:      Zayra Ceballos is a 28 y.o.  female with IUP at 39w6d weeks gestation who presents to L&D for induction.  Care this pregnancy has been with Dr. Palafox    PMHx:   Past Medical History:   Diagnosis Date    Anxiety disorder, unspecified     Chlamydial cervicitis 2019    Herpes labialis        PSHx:   Past Surgical History:   Procedure Laterality Date    ADENOIDECTOMY      APPENDECTOMY      AUGMENTATION OF BREAST      DILATION AND CURETTAGE OF UTERUS      TONSILLECTOMY         All:   Review of patient's allergies indicates:   Allergen Reactions    Bactrim [sulfamethoxazole-trimethoprim] Hives    Lortab [hydrocodone-acetaminophen] Nausea And Vomiting       Meds:   Medications Prior to Admission   Medication Sig Dispense Refill Last Dose    prenatal vit/iron fum/folic ac (PRENATAL 1+1 ORAL) Take by mouth.   2024    sertraline (ZOLOFT) 50 MG tablet Take 50 mg by mouth once daily.   2024    aspirin 81 MG Chew Take 81 mg by mouth once daily.   More than a month    ferric maltol (ACCRUFER ORAL) Take by mouth.   More than a month    valACYclovir (VALTREX) 500 MG tablet Take 1 g by mouth as needed.   More than a month       SH:   Social History     Socioeconomic History    Marital status: Significant Other   Tobacco Use    Smoking status: Former     Types: Vaping w/o nicotine    Smokeless tobacco: Never   Substance and Sexual Activity    Alcohol use: Not Currently    Drug use: Never    Sexual activity: Yes     Social Determinants of Health     Financial Resource Strain: Low Risk  (2023)    [facility] and Affiliates    Overall Financial Resource Strain (CARDIA)     Difficulty of Paying Living Expenses: Not hard at all   Food Insecurity: No Food Insecurity (2023)    Received from [facility]  "System and Its Subsidiaries and Affiliates    Hunger Vital Sign     Worried About Running Out of Food in the Last Year: Never true     Ran Out of Food in the Last Year: Never true   Transportation Needs: No Transportation Needs (2023)    Received from [facility] and Its Subsidiaries and Affiliates    PRAPARE - Transportation     Lack of Transportation (Medical): No     Lack of Transportation (Non-Medical): No   Physical Activity: Inactive (2023)    Received from [facility] and Its [facility] and Affiliates    Exercise Vital Sign     Days of Exercise per Week: 0 days     Minutes of Exercise per Session: 0 min   Stress: Stress Concern Present (2023)    [facility]    [facility] of Occupational Health - Occupational Stress Questionnaire     Feeling of Stress : Very much       FH:   Family History   Problem Relation Name Age of Onset    Hypertension Mother      Hypertension Father         OBHx:   OB History    Para Term  AB Living   4 0 0 0 3 0   SAB IAB Ectopic Multiple Live Births   2 0 1 0 0      # Outcome Date GA Lbr Donovan/2nd Weight Sex Type Anes PTL Lv   4 Current            3 Ectopic            2 2018           1 2016               Objective:      /82   Pulse 87   Temp 97.7 °F (36.5 °C)   Resp 16   Ht 5' 2" (1.575 m)   Wt 93.4 kg (206 lb)   SpO2 100%   Breastfeeding No   BMI 37.68 kg/m²   Body mass index is 37.68 kg/m².    General:   alert and cooperative   HEENT:  normocephalic, atraumatic   Lungs:   clear to auscultation bilaterally   Heart:   regular rate and rhythm, S1, S2 normal   Abdomen:  gravid, non-tender   Extremities non-tender, no edema   Derm: no rashes or lesions   Psych: appropriate mood and affect   Pelvis:  adequate         Lab Review  GBS: negative      Assessment:     28 " y.o.  at 39w6d weeks gestation.  IOL    There are no hospital problems to display for this patient.         Plan:     1. Risks, benefits, alternatives and possible complications have been discussed in detail with the patient. All questions have been answered, and Ms. Ceballos has voiced understanding and agrees to the treatment plan.  2. Consents signed and in chart  3. Admit to Labor and Delivery unit  4. S/p cervidil.

## 2024-06-22 LAB
BASOPHILS # BLD AUTO: 0.05 X10(3)/MCL
BASOPHILS NFR BLD AUTO: 0.3 %
EOSINOPHIL # BLD AUTO: 0 X10(3)/MCL (ref 0–0.9)
EOSINOPHIL NFR BLD AUTO: 0 %
ERYTHROCYTE [DISTWIDTH] IN BLOOD BY AUTOMATED COUNT: 14 % (ref 11.5–17)
HCT VFR BLD AUTO: 26.3 % (ref 37–47)
HGB BLD-MCNC: 8.4 G/DL (ref 12–16)
IMM GRANULOCYTES # BLD AUTO: 0.11 X10(3)/MCL (ref 0–0.04)
IMM GRANULOCYTES NFR BLD AUTO: 0.7 %
LYMPHOCYTES # BLD AUTO: 0.89 X10(3)/MCL (ref 0.6–4.6)
LYMPHOCYTES NFR BLD AUTO: 5.8 %
MCH RBC QN AUTO: 28.3 PG (ref 27–31)
MCHC RBC AUTO-ENTMCNC: 31.9 G/DL (ref 33–36)
MCV RBC AUTO: 88.6 FL (ref 80–94)
MONOCYTES # BLD AUTO: 0.47 X10(3)/MCL (ref 0.1–1.3)
MONOCYTES NFR BLD AUTO: 3.1 %
NEUTROPHILS # BLD AUTO: 13.85 X10(3)/MCL (ref 2.1–9.2)
NEUTROPHILS NFR BLD AUTO: 90.1 %
NRBC BLD AUTO-RTO: 0 %
PLATELET # BLD AUTO: 216 X10(3)/MCL (ref 130–400)
PMV BLD AUTO: 9 FL (ref 7.4–10.4)
RBC # BLD AUTO: 2.97 X10(6)/MCL (ref 4.2–5.4)
WBC # BLD AUTO: 15.37 X10(3)/MCL (ref 4.5–11.5)

## 2024-06-22 PROCEDURE — 11000001 HC ACUTE MED/SURG PRIVATE ROOM

## 2024-06-22 PROCEDURE — 63600175 PHARM REV CODE 636 W HCPCS: Performed by: ANESTHESIOLOGY

## 2024-06-22 PROCEDURE — 63600175 PHARM REV CODE 636 W HCPCS: Performed by: OBSTETRICS & GYNECOLOGY

## 2024-06-22 PROCEDURE — 85025 COMPLETE CBC W/AUTO DIFF WBC: CPT | Performed by: OBSTETRICS & GYNECOLOGY

## 2024-06-22 PROCEDURE — 25000003 PHARM REV CODE 250: Performed by: OBSTETRICS & GYNECOLOGY

## 2024-06-22 PROCEDURE — 72100003 HC LABOR CARE, EA. ADDL. 8 HRS

## 2024-06-22 PROCEDURE — 36415 COLL VENOUS BLD VENIPUNCTURE: CPT | Performed by: OBSTETRICS & GYNECOLOGY

## 2024-06-22 RX ADMIN — SIMETHICONE 80 MG: 80 TABLET, CHEWABLE ORAL at 10:06

## 2024-06-22 RX ADMIN — SODIUM CHLORIDE, POTASSIUM CHLORIDE, SODIUM LACTATE AND CALCIUM CHLORIDE: 600; 310; 30; 20 INJECTION, SOLUTION INTRAVENOUS at 05:06

## 2024-06-22 RX ADMIN — ACETAMINOPHEN 325MG 650 MG: 325 TABLET ORAL at 06:06

## 2024-06-22 RX ADMIN — DIPHENHYDRAMINE HYDROCHLORIDE 12.5 MG: 50 INJECTION INTRAMUSCULAR; INTRAVENOUS at 06:06

## 2024-06-22 RX ADMIN — ACETAMINOPHEN 325MG 650 MG: 325 TABLET ORAL at 09:06

## 2024-06-22 RX ADMIN — SERTRALINE HYDROCHLORIDE 50 MG: 50 TABLET ORAL at 09:06

## 2024-06-22 RX ADMIN — KETOROLAC TROMETHAMINE 30 MG: 30 INJECTION, SOLUTION INTRAMUSCULAR; INTRAVENOUS at 10:06

## 2024-06-22 RX ADMIN — DOCUSATE SODIUM 200 MG: 100 CAPSULE, LIQUID FILLED ORAL at 08:06

## 2024-06-22 RX ADMIN — HYDROMORPHONE HYDROCHLORIDE 1 MG: 2 INJECTION INTRAMUSCULAR; INTRAVENOUS; SUBCUTANEOUS at 12:06

## 2024-06-22 RX ADMIN — KETOROLAC TROMETHAMINE 30 MG: 30 INJECTION, SOLUTION INTRAMUSCULAR; INTRAVENOUS at 05:06

## 2024-06-22 RX ADMIN — DOCUSATE SODIUM 200 MG: 100 CAPSULE, LIQUID FILLED ORAL at 09:06

## 2024-06-22 RX ADMIN — KETOROLAC TROMETHAMINE 30 MG: 30 INJECTION, SOLUTION INTRAMUSCULAR; INTRAVENOUS at 02:06

## 2024-06-22 NOTE — L&D DELIVERY NOTE
Pre-op Diagnosis:   1.  Intrauterine Pregnancy at 39 6/7 weeks  2.  Failed Induction    Postop Diagnosis: Same  Procedure: Primary Low Transverse  Section via Pfannenstiel incision  Surgeon: Kaci Palafox MD  Anesthesia: Epidural  Complications: None  QBL: per RN  Findings: Normal uterus, tubes and ovaries.  Viable male infant with Apgars of 8,9 weighing 8lbs 5oz  Specimen: Placenta    Indication and Consent: Patient presented to L&D for induction.  Arrest of dilation at 1.5 despite arom and adequate contractions on pitocin.  I discussed risks, benefits and options with her in detail.  She desired to proceed with a primary  delivery. The patient understood that the risks of  section include, but are not limited to, visceral or vascular injury, infection, blood loss and the need for transfusion, prolonged hospitalization and reoperation.  The patient stated understanding and desired to proceed.  All questions were answered.     Procedure: She was taken to the operating room where epidural anesthesia was found to be adequate.  Ancef and Azithro was given for infection prophylaxis.  She was prepped and draped in the dorsal supine position with a leftward tilt.  A Pfannenstiel skin incision was made with the scalpel and carried down to the fascia with the Bovie.  The fascia was incised and extended laterally with the Bovie.  The superior aspect of the fascia was grasped with the Kocher clamps.  The underlying rectus muscle was dissected off sharply with Nicholson scissors.  In a similar fashion, the inferior aspect of the fascia was elevated with the Kocher clamps and the rectus and pyramidalis muscles were dissected off.  Hemostasis was achieved with the Bovie.  The rectus muscle was  in the midline down to the level of the pubic symphysis.  Preperitoneal fatty tissue was bluntly dissected to expose the peritoneum.  The peritoneum was found to be free of adherent bowel and entered sharply  with scissors.  The peritoneal incision was extended superiorly and inferiorly to the bladder reflection with good visualization of the bladder.  The Ru retractor was placed. The vesicouterine peritoneum identified, then opened with scissors and the bladder flap was developed.   The lower uterine segment was incised with a scalpel.  The amniotic sac was ruptured and clear fluid was noted.  The uterine incision was extended bluntly with lateral and upward traction.  The fetus was in cephalic presentation.  The head was gently elevated out of the pelvis and gentle fundal pressure was applied once the head was brought to the incision.  The infant was delivered without difficulty.  The mouth and nose were suctioned with bulb suction.  The cord was clamped and cut.  The infant was handed off to waiting NICU and respiratory personnel.  IV Pitocin was initiated to facilitate uterine contractions.  The placenta was delivered intact with manual massage of the uterine fundus.  The inside of the uterus was gently wiped with a lap sponge to assure complete removal of placental membranes.  The uterine incision was closed with a 0 Vicryl suture in a running locked fashion.  Blood clots and fluid were wiped out of the abdomen and pelvis with moist lap sponges.  The uterine incision was reinspected and good hemostasis was noted.  The Ru retractor was removed.   The peritoneum was closed in a running fashion. The rectus muscles were loosely reapproximated.  The fascia was closed with 1-0 Vicryl in a continuous running fashion.  The subcuticular tissue was irrigated with warm normal saline.  Subcuticular tissue was reapproximated with plain gut.   Skin was closed using Monocryl in a subcuticular fashion.  The patient tolerated the procedure well.  All sponge and lap counts were correct times 2.  The patient was taken to the recovery room in stable condition.

## 2024-06-22 NOTE — TRANSFER OF CARE
"Anesthesia Transfer of Care Note    Patient: Zayra Ceballos    Procedure(s) Performed: Procedure(s) (LRB):   SECTION (N/A)    Patient location: Labor and Delivery    Anesthesia Type: epidural    Transport from OR: Transported from OR on room air with adequate spontaneous ventilation    Post pain: adequate analgesia    Post assessment: no apparent anesthetic complications    Post vital signs: stable    Level of consciousness: awake, alert and oriented    Nausea/Vomiting: no nausea/vomiting    Complications: none    Transfer of care protocol was followed      Last vitals: Visit Vitals  /61   Pulse 89   Temp 36.7 °C (98.1 °F)   Resp 16   Ht 5' 2" (1.575 m)   Wt 93.4 kg (206 lb)   SpO2 97%   Breastfeeding No   BMI 37.68 kg/m²     "

## 2024-06-22 NOTE — ANESTHESIA POSTPROCEDURE EVALUATION
Anesthesia Post Evaluation    Patient: Zayra Ceballos    Procedure(s) Performed: Procedure(s) (LRB):   SECTION (N/A)    Final Anesthesia Type: epidural      Patient location during evaluation: labor & delivery  Patient participation: Yes- Able to Participate  Level of consciousness: awake and alert  Post-procedure vital signs: reviewed and stable  Pain management: adequate  Airway patency: patent  CHRIS mitigation strategies: Multimodal analgesia  PONV status at discharge: No PONV  Anesthetic complications: no      Cardiovascular status: blood pressure returned to baseline and hemodynamically stable  Respiratory status: unassisted and spontaneous ventilation  Hydration status: euvolemic  Follow-up not needed.          Vitals Value Taken Time   /68 24 0504   Temp 36.8 °C (98.2 °F) 24 0404   Pulse 76 24 0504   Resp 18 24 0014   SpO2 98 % 24 2345         Event Time   Out of Recovery 2024 23:50:00         Pain/Shruthi Score: Pain Rating Prior to Med Admin: 4 (2024 12:14 AM)  Pain Rating Post Med Admin: 2 (2024  1:05 AM)  Shruthi Score: 10 (2024 11:45 PM)

## 2024-06-22 NOTE — PROGRESS NOTES
PostPartum Progress Note        Subjective:      Post-Operative Day #1 after  delivery secondary to failed induction .    Patient is without complaints. Lochia decreasing.  Baby in NICU for O2.  Pain is well controlled. Patient is ambulating. Tolerating Full Regular diet.Overall mother and baby are doing well.     Objective:      Temp:  [97.5 °F (36.4 °C)-98.9 °F (37.2 °C)] 98.3 °F (36.8 °C)  Pulse:  [] 73  Resp:  [16-18] 16  SpO2:  [87 %-100 %] 96 %  BP: (106-154)/() 126/86    Intake/Output Summary (Last 24 hours) at 2024 1005  Last data filed at 2024 0600  Gross per 24 hour   Intake 650 ml   Output 2730 ml   Net -2080 ml     Body mass index is 37.68 kg/m².    General: no acute distress  Abdomen: soft, non-tender, non-distended; Fundus firm and below the umbilicus  Bandage intact.       Group & Rh   Date Value Ref Range Status   2024 B POS  Final     Recent Results (from the past 336 hour(s))   CBC with Differential    Collection Time: 24  4:34 AM   Result Value Ref Range    WBC 15.37 (H) 4.50 - 11.50 x10(3)/mcL    Hgb 8.4 (L) 12.0 - 16.0 g/dL    Hct 26.3 (L) 37.0 - 47.0 %    Platelet 216 130 - 400 x10(3)/mcL   CBC with Differential    Collection Time: 24  8:08 PM   Result Value Ref Range    WBC 9.43 4.50 - 11.50 x10(3)/mcL    Hgb 8.9 (L) 12.0 - 16.0 g/dL    Hct 27.0 (L) 37.0 - 47.0 %    Platelet 299 130 - 400 x10(3)/mcL          Assessment:     28 y.o.  S/P  Delivery Post-Operative Day #1  - Doing Well      Plan:     1. Continue routine postpartum care  2. Encourage ambulation

## 2024-06-22 NOTE — PROGRESS NOTES
S: 28 y.o.  at 39w6d with EDC of 2024, admitted for induction.  No progress beyond 1.5 cm despite AROM and adequate contractions on pitocin.  Now s/p prolonged deceleration and intermittent late decelerations    O:   BP: 117/61    FHT: Cat II remote from delivery despite resuscitative efforts  [place]/IUPC: q 1-2 minutes  SVE:1.5/80/-3 vertex with caput      ASSESSMENT: 39w6d, Failed induction      PLAN:    Findings reviewed with patient and her family along with recommendation for 1LTCS  Ancef/Azithro/SCDs for prophylaxis  Consents signed, questions answered  Will proceed shortly

## 2024-06-23 PROCEDURE — 25000003 PHARM REV CODE 250: Performed by: OBSTETRICS & GYNECOLOGY

## 2024-06-23 PROCEDURE — 11000001 HC ACUTE MED/SURG PRIVATE ROOM

## 2024-06-23 RX ADMIN — OXYCODONE HYDROCHLORIDE AND ACETAMINOPHEN 1 TABLET: 5; 325 TABLET ORAL at 04:06

## 2024-06-23 RX ADMIN — SIMETHICONE 80 MG: 80 TABLET, CHEWABLE ORAL at 01:06

## 2024-06-23 RX ADMIN — IBUPROFEN 800 MG: 800 TABLET, FILM COATED ORAL at 10:06

## 2024-06-23 RX ADMIN — IBUPROFEN 800 MG: 800 TABLET, FILM COATED ORAL at 05:06

## 2024-06-23 RX ADMIN — DOCUSATE SODIUM 200 MG: 100 CAPSULE, LIQUID FILLED ORAL at 07:06

## 2024-06-23 RX ADMIN — IBUPROFEN 800 MG: 800 TABLET, FILM COATED ORAL at 01:06

## 2024-06-23 RX ADMIN — SERTRALINE HYDROCHLORIDE 50 MG: 50 TABLET ORAL at 08:06

## 2024-06-23 RX ADMIN — DOCUSATE SODIUM 200 MG: 100 CAPSULE, LIQUID FILLED ORAL at 08:06

## 2024-06-23 NOTE — PROGRESS NOTES
PostPartum Progress Note        Subjective:      Post-Operative Day #2 after  delivery secondary to failed induction .    Patient is without complaints. Lochia decreasing.  Pumping for baby in NICU.  Pain is well controlled. Patient is ambulating. Tolerating Full Regular diet.Overall mother and baby are doing well.     Objective:      Temp:  [97.7 °F (36.5 °C)-98 °F (36.7 °C)] 97.7 °F (36.5 °C)  Pulse:  [81-94] 87  Resp:  [16-18] 16  SpO2:  [95 %-98 %] 97 %  BP: (119-132)/(79-89) 122/86  No intake or output data in the 24 hours ending 24 1004  Body mass index is 37.68 kg/m².    General: no acute distress  Abdomen: soft, non-tender, non-distended; Fundus firm and below the umbilicus  Incision- intact, healing well, no sign of infection      Group & Rh   Date Value Ref Range Status   2024 B POS  Final     Recent Results (from the past 336 hour(s))   CBC with Differential    Collection Time: 24  4:34 AM   Result Value Ref Range    WBC 15.37 (H) 4.50 - 11.50 x10(3)/mcL    Hgb 8.4 (L) 12.0 - 16.0 g/dL    Hct 26.3 (L) 37.0 - 47.0 %    Platelet 216 130 - 400 x10(3)/mcL   CBC with Differential    Collection Time: 24  8:08 PM   Result Value Ref Range    WBC 9.43 4.50 - 11.50 x10(3)/mcL    Hgb 8.9 (L) 12.0 - 16.0 g/dL    Hct 27.0 (L) 37.0 - 47.0 %    Platelet 299 130 - 400 x10(3)/mcL          Assessment:     28 y.o.  S/P  Delivery Post-Operative Day #2  - Doing Well      Plan:     1. Continue routine postpartum care  2. Encourage ambulation

## 2024-06-24 LAB
ABO + RH BLD: NORMAL
ABO + RH BLD: NORMAL
BLD PROD TYP BPU: NORMAL
BLD PROD TYP BPU: NORMAL
BLOOD UNIT EXPIRATION DATE: NORMAL
BLOOD UNIT EXPIRATION DATE: NORMAL
BLOOD UNIT TYPE CODE: 7300
BLOOD UNIT TYPE CODE: 7300
CROSSMATCH INTERPRETATION: NORMAL
CROSSMATCH INTERPRETATION: NORMAL
DISPENSE STATUS: NORMAL
DISPENSE STATUS: NORMAL
UNIT NUMBER: NORMAL
UNIT NUMBER: NORMAL

## 2024-06-24 PROCEDURE — 25000003 PHARM REV CODE 250: Performed by: OBSTETRICS & GYNECOLOGY

## 2024-06-24 PROCEDURE — 11000001 HC ACUTE MED/SURG PRIVATE ROOM

## 2024-06-24 RX ORDER — OXYCODONE AND ACETAMINOPHEN 5; 325 MG/1; MG/1
1 TABLET ORAL EVERY 4 HOURS PRN
Qty: 28 TABLET | Refills: 0 | Status: SHIPPED | OUTPATIENT
Start: 2024-06-24

## 2024-06-24 RX ORDER — IBUPROFEN 800 MG/1
800 TABLET ORAL EVERY 8 HOURS
Qty: 30 TABLET | Refills: 0 | Status: SHIPPED | OUTPATIENT
Start: 2024-06-24

## 2024-06-24 RX ADMIN — IBUPROFEN 800 MG: 800 TABLET, FILM COATED ORAL at 01:06

## 2024-06-24 RX ADMIN — OXYCODONE HYDROCHLORIDE AND ACETAMINOPHEN 1 TABLET: 5; 325 TABLET ORAL at 03:06

## 2024-06-24 RX ADMIN — IBUPROFEN 800 MG: 800 TABLET, FILM COATED ORAL at 06:06

## 2024-06-24 RX ADMIN — OXYCODONE HYDROCHLORIDE AND ACETAMINOPHEN 1 TABLET: 5; 325 TABLET ORAL at 12:06

## 2024-06-24 RX ADMIN — DOCUSATE SODIUM 200 MG: 100 CAPSULE, LIQUID FILLED ORAL at 07:06

## 2024-06-24 RX ADMIN — SERTRALINE HYDROCHLORIDE 50 MG: 50 TABLET ORAL at 09:06

## 2024-06-24 RX ADMIN — DOCUSATE SODIUM 200 MG: 100 CAPSULE, LIQUID FILLED ORAL at 09:06

## 2024-06-24 RX ADMIN — IBUPROFEN 800 MG: 800 TABLET, FILM COATED ORAL at 11:06

## 2024-06-24 RX ADMIN — OXYCODONE HYDROCHLORIDE AND ACETAMINOPHEN 1 TABLET: 5; 325 TABLET ORAL at 07:06

## 2024-06-24 RX ADMIN — OXYCODONE HYDROCHLORIDE AND ACETAMINOPHEN 1 TABLET: 5; 325 TABLET ORAL at 09:06

## 2024-06-24 NOTE — PROGRESS NOTES
. Admit Assessment    Patient Identification  Param Ceballos   :  [birth year]24  Admit Date:  2024  Attending Provider:  Bart Fu MD              Referral:   Pt was admitted to NICU with a diagnosis of <principal problem not specified>, and was admitted this hospital stay due to Single liveborn infant [Z38.2].   is involved and patient was referred to the Social Work Department via Routine NICU consult.  Mom and Dad were in her post-partum room and very appropriate during the assessment.       Verified her face sheet information: Demographics verified      Living Situation:      Resides at [address] [place] [address] [id number] [place] [id number], phone: [phone] (home).             <Narrative here> CM met with mom at bedside in post partum room. Dad was also present in the room. Mom identified baby as Sanjay Fuentes weighing 8 lbs and 5 ounces at birth. Mom had a vaginal delivery. Mom identified FOB as Huey Fuentes who will be on the birth certificate. Mom stated that it will be she, dad and baby only living in the home. They are first time parents. Her additional supports are the maternal grandmother, Melania Ceballos. Mom identified her OB and PEDS doctor. Mom stated that she plans to breastfeed baby. Mom has all supplies to include a car seat, bassinet and crib. 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 Mom has a history of anxiety and is prescribed Zoloft. Mom stated that she does not receive WIC or SNAP benefits. The family has transportation to meet their mobile needs. Mom denies DV and stated that she feels safe in the home. Mom was provided with a resource packet.       OB: Dr. Kaci Palafox  Pedi: Dr. Nate Fu     Confirmed Supplies: Car Seat, bassinet and Crib    History/Current Symptoms of Anxiety/Depression: Hx of Anxiety and currently taking meds.   Discussed PPD and identifying symptoms and provided mom with PPD counseling resources  and symptom brochure.       Identified Support:  Melania Ceballos, maternal grandmother at [phone].      History/Current Substance Use:  Mom denies     Indications of Abuse/Neglect:   None to note.        Emotional/Behavioral/Cognitive Issues: None known      Current RX Prescriptions: Zoloft     Adequate Discharge/Visiting Transportation: Family has transportation     RAMAN Signed/Filed: NA     Qualifies:   Early steps: No  SSI: No     NICU Assessment completed in Flowsheets: yes            Plan: CM to continue to follow mom until discharge to provide any additional support or resources needed.     Patient/caregiver engaged in treatment planning process.      providing psychosocial and supportive counseling, resources, education, assistance and discharge planning as appropriate.  Patient/caregiver state understanding of  available resources,  following, remains available.        Patient/Caregiver informed of right to choose providers or agencies.  Patient/Caregiver provides permission to release any necessary information to [facility] and to Non-[facility] agencies as needed to facilitate patient care, treatment planning, and patient discharge planning.  Written and verbal resources provided.

## 2024-06-24 NOTE — PROGRESS NOTES
PostPartum Progress Note        Subjective:      Post-Operative Day #3 after  delivery secondary to failed induction .    Patient is without complaints. Lochia decreasing.  Pumping for baby in NICU.  Pain is well controlled. Patient is ambulating. Tolerating Full Regular diet.Overall mother and baby are doing well.     Objective:      Temp:  [98.1 °F (36.7 °C)-98.3 °F (36.8 °C)] 98.3 °F (36.8 °C)  Pulse:  [86-96] 86  Resp:  [16-18] 18  SpO2:  [95 %-99 %] 95 %  BP: (119-143)/(78-94) 143/94  No intake or output data in the 24 hours ending 24 0830  Body mass index is 37.68 kg/m².    General: no acute distress  Abdomen: soft, non-tender, non-distended; Fundus firm and at the umbilicus  Incision- intact, healing well, no sign of infection  Extremities: non-tender, symmetric, trace edema    Group & Rh   Date Value Ref Range Status   2024 B POS  Final     Recent Results (from the past 336 hour(s))   CBC with Differential    Collection Time: 24  4:34 AM   Result Value Ref Range    WBC 15.37 (H) 4.50 - 11.50 x10(3)/mcL    Hgb 8.4 (L) 12.0 - 16.0 g/dL    Hct 26.3 (L) 37.0 - 47.0 %    Platelet 216 130 - 400 x10(3)/mcL   CBC with Differential    Collection Time: 24  8:08 PM   Result Value Ref Range    WBC 9.43 4.50 - 11.50 x10(3)/mcL    Hgb 8.9 (L) 12.0 - 16.0 g/dL    Hct 27.0 (L) 37.0 - 47.0 %    Platelet 299 130 - 400 x10(3)/mcL          Assessment:     28 y.o.  S/P  Delivery Post-Operative Day #3  - Doing Well      Plan:     1. Continue routine postpartum care, ambulate , hydrate.   2. Plan for D/C  later today or in AM.

## 2024-06-25 VITALS
DIASTOLIC BLOOD PRESSURE: 94 MMHG | RESPIRATION RATE: 18 BRPM | OXYGEN SATURATION: 98 % | TEMPERATURE: 98 F | BODY MASS INDEX: 37.91 KG/M2 | HEART RATE: 94 BPM | HEIGHT: 62 IN | WEIGHT: 206 LBS | SYSTOLIC BLOOD PRESSURE: 138 MMHG

## 2024-06-25 PROCEDURE — 25000003 PHARM REV CODE 250: Performed by: OBSTETRICS & GYNECOLOGY

## 2024-06-25 RX ADMIN — IBUPROFEN 800 MG: 800 TABLET, FILM COATED ORAL at 09:06

## 2024-06-25 RX ADMIN — OXYCODONE HYDROCHLORIDE AND ACETAMINOPHEN 1 TABLET: 5; 325 TABLET ORAL at 06:06

## 2024-06-25 RX ADMIN — DOCUSATE SODIUM 200 MG: 100 CAPSULE, LIQUID FILLED ORAL at 09:06

## 2024-06-25 RX ADMIN — OXYCODONE HYDROCHLORIDE AND ACETAMINOPHEN 1 TABLET: 5; 325 TABLET ORAL at 12:06

## 2024-06-25 RX ADMIN — SERTRALINE HYDROCHLORIDE 50 MG: 50 TABLET ORAL at 09:06

## 2024-06-25 NOTE — DISCHARGE SUMMARY
"Delivery Discharge Summary  Obstetrics      Primary OB Clinician: Kaci Palafox MD    Discharge Provider: MY MEEK MD    Admission date: 2024  Discharge date: 2024    Admit Dx:   Discharge Dx:    Patient Active Problem List   Diagnosis    Failed induction     delivery delivered       Procedure: , due to failed induction    Hospital Course:  Zayra Ceballos is a 28 y.o. now  who was admitted on 2024 for delivery. Patient delivered a viable . Please see delivery note for further details. Pt was in stable condition post delivery and was transferred to the Mother-Baby Unit. Her postpartum course was uncomplicated. On the date of discharge, patient's pain is controlled with oral pain medications. She is tolerating ambulation without SOB or CP, and PO diet without N/V. Reported lochia is within the normal range. Pt in stable condition and ready for discharge.     Pertinent studies:  Postpartum CBC  Lab Results   Component Value Date    WBC 15.37 (H) 2024    HGB 8.4 (L) 2024    HCT 26.3 (L) 2024    MCV 88.6 2024     2024       Delivery:    Episiotomy: None   Lacerations: None   Repair suture: None   Repair # of packets: 0   Blood loss (ml):       Birth information:  YOB: 2024   Time of birth: 10:16 PM   Sex: male   Delivery type: , Low Transverse   Gestational Age: 39w6d     Measurements    Weight: 3780 g  Weight (lbs): 8 lb 5.3 oz  Length: 50.2 cm  Length (in): 19.75"  Head circumference: 34.3 cm         Delivery Clinician: Delivery Providers    Delivering clinician: Kaci Palafox MD   Provider Role    Ru Chapman RN Registered Nurse    Estela Orlando RN Registered Nurse    Ritchie Mccarty CRNA Nurse Anesthetist    [facility]    Charlene Mock RN Registered Nurse    Rula Sheehan RN Registered Nurse             Additional  information:  Forceps:    Vacuum:    Breech:  "   Observed anomalies      Living?:     Apgars    Living status: Living  Apgar Component Scores:  1 min.:  5 min.:  10 min.:  15 min.:  20 min.:    Skin color:  0  1       Heart rate:  2  2       Reflex irritability:  2  2       Muscle tone:  2  2       Respiratory effort:  2  2       Total:  8  9       Apgars assigned by: MY VERAS RN         Placenta: Delivered:       appearance    Disposition: To home, self care    Follow Up: 2 weeks    Patient Instructions:   1. Call the office for any bleeding >2 pads/hour for >2 hours, temperature >100.4, pain that is uncontrolled with medications, or for any other concerns.  2. Pelvic rest and no tub baths x 6 weeks.  3. No driving while on narcotics.    Current Discharge Medication List        START taking these medications    Details   ibuprofen (ADVIL,MOTRIN) 800 MG tablet Take 1 tablet (800 mg total) by mouth every 8 (eight) hours.  Qty: 30 tablet, Refills: 0      oxyCODONE-acetaminophen (PERCOCET) 5-325 mg per tablet Take 1 tablet by mouth every 4 (four) hours as needed for Pain.  Qty: 28 tablet, Refills: 0    Comments: Quantity prescribed more than 7 day supply? No           CONTINUE these medications which have NOT CHANGED    Details   prenatal vit/iron fum/folic ac (PRENATAL 1+1 ORAL) Take by mouth.      sertraline (ZOLOFT) 50 MG tablet Take 50 mg by mouth once daily.      aspirin 81 MG Chew Take 81 mg by mouth once daily.      ferric maltol (ACCRUFER ORAL) Take by mouth.      valACYclovir (VALTREX) 500 MG tablet Take 1 g by mouth as needed.             MY MEEK

## 2024-06-25 NOTE — PLAN OF CARE
Problem: Adult Inpatient Plan of Care  Goal: Plan of Care Review  Outcome: Progressing  Goal: Patient-Specific Goal (Individualized)  2024 by Tenisha Segura RN  Flowsheets (Taken 2024)  Patient/Family-Specific Goals (Include Timeframe): My baby will make progress with weening his oxygen  2024 by Tenisha Segura RN  Outcome: Progressing  Goal: Absence of Hospital-Acquired Illness or Injury  Outcome: Progressing  Goal: Optimal Comfort and Wellbeing  Outcome: Progressing  Goal: Readiness for Transition of Care  Outcome: Progressing     Problem:  Fall Injury Risk  Goal: Absence of Fall, Infant Drop and Related Injury  Outcome: Progressing     Problem: Infection  Goal: Absence of Infection Signs and Symptoms  Outcome: Progressing     Problem: Wound  Goal: Optimal Coping  Outcome: Progressing  Goal: Optimal Functional Ability  Outcome: Progressing  Goal: Absence of Infection Signs and Symptoms  Outcome: Progressing  Goal: Improved Oral Intake  Outcome: Progressing  Goal: Optimal Pain Control and Function  Outcome: Progressing  Goal: Skin Health and Integrity  Outcome: Progressing  Goal: Optimal Wound Healing  Outcome: Progressing     Problem: Postpartum ( Delivery)  Goal: Successful Parent Role Transition  Outcome: Progressing  Goal: Hemostasis  Outcome: Progressing  Goal: Effective Bowel Elimination  Outcome: Progressing  Goal: Fluid and Electrolyte Balance  Outcome: Progressing  Goal: Absence of Infection Signs and Symptoms  Outcome: Progressing  Goal: Anesthesia/Sedation Recovery  Outcome: Progressing  Goal: Optimal Pain Control and Function  Outcome: Progressing  Goal: Nausea and Vomiting Relief  Outcome: Progressing  Goal: Effective Urinary Elimination  Outcome: Progressing  Goal: Effective Oxygenation and Ventilation  Outcome: Progressing

## (undated) DEVICE — SEE MEDLINE ITEM 156931

## (undated) DEVICE — PAD UNDERPAD 30X30

## (undated) DEVICE — BINDER ABDOM 4PANEL 12IN LG/XL

## (undated) DEVICE — BULB SYRINGE EAR IRRIGATION

## (undated) DEVICE — SUT MONOCRYL 4-0 PS-1 UND

## (undated) DEVICE — PAD SANITARY OB STERILE

## (undated) DEVICE — DRESSING ANTIMIC ISLAND 4X10IN

## (undated) DEVICE — CAP BABY BEANIE

## (undated) DEVICE — SUT CTD VICRYL 1 VIL BR CTX

## (undated) DEVICE — TRAY CATH FOL SIL URIMTR 16FR

## (undated) DEVICE — Device

## (undated) DEVICE — ELECTRODE REM PLYHSV RETURN 9

## (undated) DEVICE — SOL WATER STRL IRR 1000ML

## (undated) DEVICE — MATTRESS HOVERMAT SPLIT TRNSF

## (undated) DEVICE — SEE MEDLINE ITEM 157117

## (undated) DEVICE — SOL NACL IRR 1000ML BTL

## (undated) DEVICE — SUT 2-0 VICRYL / CT-1

## (undated) DEVICE — SUT 2/0 27IN PLAIN GUT CT

## (undated) DEVICE — SUT 3/0 36IN COATED VICRYL

## (undated) DEVICE — SUT CTD VICRYL 0 UND BR CT